# Patient Record
Sex: MALE | Race: BLACK OR AFRICAN AMERICAN
[De-identification: names, ages, dates, MRNs, and addresses within clinical notes are randomized per-mention and may not be internally consistent; named-entity substitution may affect disease eponyms.]

---

## 2021-03-07 ENCOUNTER — HOSPITAL ENCOUNTER (EMERGENCY)
Dept: HOSPITAL 72 - EMR | Age: 31
Discharge: HOME | End: 2021-03-07
Payer: COMMERCIAL

## 2021-03-07 VITALS — HEIGHT: 69 IN | WEIGHT: 315 LBS | BODY MASS INDEX: 46.65 KG/M2

## 2021-03-07 VITALS — SYSTOLIC BLOOD PRESSURE: 161 MMHG | DIASTOLIC BLOOD PRESSURE: 91 MMHG

## 2021-03-07 DIAGNOSIS — Z76.0: Primary | ICD-10-CM

## 2021-03-07 DIAGNOSIS — R06.2: ICD-10-CM

## 2021-03-07 DIAGNOSIS — R05: ICD-10-CM

## 2021-03-07 DIAGNOSIS — J45.909: ICD-10-CM

## 2021-03-07 DIAGNOSIS — L91.8: ICD-10-CM

## 2021-03-07 PROCEDURE — 99283 EMERGENCY DEPT VISIT LOW MDM: CPT

## 2021-03-07 NOTE — EMERGENCY ROOM REPORT
History of Present Illness


General


Chief Complaint:  General Complaint


Source:  Patient





Present Illness


HPI


Disclaimer: Please note that this report is being documented using [employer]AG[employer] 

technology. This can lead to erroneous entry secondary to incorrect 

interpretation by the dictating instrument.





HPI: 30-year-old male history of asthma presented for medication refill.  He is 

requesting a controller medication in addition to a hand-held albuterol inhaler.

 Patient recently finished a course of prednisone 2 days ago.  He states he 

still having some coughing and intermittent wheezing.  He has a doctor's 

appointment at the end of the month.  Denies any fevers nausea vomiting.  

Complains of a skin tag on his neck that has been present for many years.


 


PMH: Asthma


 


PSH: Reviewed


 


 Social Hx: Denies smoking drinking or illicit drug use


Allergies:  


Coded Allergies:  


     No Known Allergies (Unverified , 3/7/21)





COVID-19 Screening


Contact w/high risk pt:  No


Experienced COVID-19 symptoms?:  No


COVID-19 Testing performed PTA:  No





Patient History


Reviewed Nursing Documentation:  PMH: Agreed; PSxH: Agreed





Nursing Documentation-PMH


Past Medical History:  No History, Except For


Hx Asthma:  Yes





Review of Systems


All Other Systems:  negative except mentioned in HPI





Physical Exam





Vital Signs








  Date Time  Temp Pulse Resp B/P (MAP) Pulse Ox O2 Delivery O2 Flow Rate FiO2


 


3/7/21 18:23 98.2 90 20 152/92 (112) 94 Room Air  








Sp02 EP Interpretation:  reviewed, normal


General Appearance:  well appearing, no apparent distress


Head:  normocephalic, atraumatic


Eyes:  bilateral eye PERRL, bilateral eye EOMI


ENT:  hearing grossly normal, moist mucus membranes


Neck:  full range of motion, supple


Respiratory:  no rhonchi, no respiratory distress, no retraction, other - Scant 

expiratory wheeze noted


Cardiovascular #1:  normal peripheral pulses, regular rate, rhythm, no murmur


Gastrointestinal:  non tender, soft, non-distended, no guarding


Neurologic:  alert, oriented x3, no focal defects


Skin:  normal color, warm/dry





Medical Decision Making


Diagnostic Impression:  


   Primary Impression:  


   Medication refill


   Additional Impression:  


   History of asthma


ER Course


Patient presented for medication refill.  He is requesting asthma medications.  

He had a very scant wheeze.  But denies any shortness of breath.  In the ER did 

give him 1 albuterol treatment.  Will discharge with a controller medication 

albuterol.  He just finished a course of prednisone and did not wish to restart 

any prednisone since he just finished however I did provide a prescription of 

prednisone if he does not improve with his controller medication.  Otherwise he 

was in no respiratory distress he was nontoxic-appearing.  Will be discharged 

home with follow-up with his PMD.





Last Vital Signs








  Date Time  Temp Pulse Resp B/P (MAP) Pulse Ox O2 Delivery O2 Flow Rate FiO2


 


3/7/21 18:33  90 20     


 


3/7/21 18:23 98.2   152/92 (112) 94 Room Air  








Disposition:  HOME, SELF-CARE


Condition:  Stable


Scripts


Fluticasone/Salmeterol (Advair 250-50 Diskus) 1 Each Blst.w.dev


1 PUFF INH EVERY 12 HOURS for 30 Days, #1 EA


   Prov: Cr Merritt M.D.         3/7/21 


Albuterol Sulfate* (Albuterol Sulfate Hfa*) 8.5 Gm Hfa.aer.ad


2 PUFF INH Q4H, #1 INH


   Prov: Cr Merritt M.D.         3/7/21


Referrals:  


[facility]











[facility]











[facility]


Patient Instructions:  Asthma, Adult, Easy-to-Read





Additional Instructions:  


Patient is instructed to follow-up with her primary care doctor, primary care 

clinic or county clinic in 1 to 2 days.  Patient instructed to return for any 

worsening symptoms or concerns.











Cr Merritt M.D.             Mar 7, 2021 18:57